# Patient Record
Sex: FEMALE | Race: WHITE | URBAN - METROPOLITAN AREA
[De-identification: names, ages, dates, MRNs, and addresses within clinical notes are randomized per-mention and may not be internally consistent; named-entity substitution may affect disease eponyms.]

---

## 2017-09-26 ENCOUNTER — OFFICE (OUTPATIENT)
Dept: URBAN - METROPOLITAN AREA CLINIC 6 | Facility: CLINIC | Age: 57
End: 2017-09-26

## 2017-09-26 VITALS
WEIGHT: 166 LBS | SYSTOLIC BLOOD PRESSURE: 135 MMHG | TEMPERATURE: 95.7 F | DIASTOLIC BLOOD PRESSURE: 81 MMHG | HEIGHT: 66 IN | HEART RATE: 80 BPM

## 2017-09-26 DIAGNOSIS — K52.9 CHRONIC DIARRHEA: ICD-10-CM

## 2017-09-26 DIAGNOSIS — K29.40: ICD-10-CM

## 2017-09-26 DIAGNOSIS — K25.9: ICD-10-CM

## 2017-09-26 PROCEDURE — 99245 OFF/OP CONSLTJ NEW/EST HI 55: CPT | Performed by: SPECIALIST

## 2017-09-26 NOTE — SERVICEHPINOTES
The patient complains of diarrhea.    Diarrheal symptoms started   close to 10  years   ago.   Bowel movements have been occurring   10 - 15   times per day.    At the onset, diarrhea started   gradually  .   Stools have been occurring   at random times of the day and in the middle of the night  .   The stool is described as   very watery   in consistency.   The patient   has   seen blood in the stool.   The patient has also noted   abominal pain, dehydration, fecal urgency, nocturnal awakenings, blood and mucus in stool, and undigested foods in stool  .   Possible exposures/etiologies include   .    Suspected exacerbating factors include   .   Diarrhea is alleviated so far by   .   The patient has already tried taking   everything per patient  .   Has noted   minimal   relief.   Has had    performed thus far for evaluation.   A prior colonoscopy was performed    years ago.   Patient had a colonoscopy in 2016 and 2012, both were essentially unremarkable.Patient also reports history of gastric ulcers and previous upper endoscopy showed atrophic gastritis. She has not followed up on that. She has been taking omeprazole daily, and skipping a day of omeprazole would cause her excessive heartburn and epigastric pain.BR

## 2017-11-17 ENCOUNTER — AMBULATORY SURGICAL CENTER (OUTPATIENT)
Dept: URBAN - METROPOLITAN AREA SURGERY 5 | Facility: SURGERY | Age: 57
End: 2017-11-17

## 2017-11-17 VITALS
HEART RATE: 79 BPM | OXYGEN SATURATION: 97 % | DIASTOLIC BLOOD PRESSURE: 78 MMHG | WEIGHT: 155 LBS | TEMPERATURE: 98.2 F | SYSTOLIC BLOOD PRESSURE: 143 MMHG | HEIGHT: 66 IN | RESPIRATION RATE: 12 BRPM

## 2017-11-17 DIAGNOSIS — K52.9 NONINFECTIVE GASTROENTERITIS AND COLITIS, UNSPECIFIED: ICD-10-CM

## 2017-11-17 DIAGNOSIS — K29.70 GASTRITIS, UNSPECIFIED, WITHOUT BLEEDING: ICD-10-CM

## 2017-11-17 DIAGNOSIS — K25.7 CHRONIC GASTRIC ULCER WITHOUT HEMORRHAGE OR PERFORATION: ICD-10-CM

## 2017-11-17 DIAGNOSIS — K21.0 GASTRO-ESOPHAGEAL REFLUX DISEASE WITH ESOPHAGITIS: ICD-10-CM

## 2017-11-17 PROBLEM — R10.13 EPIGASTRIC PAIN: Status: ACTIVE | Noted: 2017-11-17

## 2017-11-17 PROBLEM — K31.89 OTHER DISEASES OF STOMACH AND DUODENUM: Status: ACTIVE | Noted: 2017-11-17

## 2017-11-17 LAB — SURGICAL: PDF REPORT: (no result)

## 2017-11-17 PROCEDURE — 43239 EGD BIOPSY SINGLE/MULTIPLE: CPT | Performed by: SPECIALIST

## 2017-11-17 RX ORDER — OMEPRAZOLE 20 MG/1
CAPSULE, DELAYED RELEASE ORAL
Qty: 90 | Status: ACTIVE

## 2017-11-20 LAB — BREATH TEST: PDF REPORT: (no result)

## 2017-12-21 ENCOUNTER — OFFICE (OUTPATIENT)
Dept: URBAN - METROPOLITAN AREA CLINIC 6 | Facility: CLINIC | Age: 57
End: 2017-12-21

## 2017-12-21 VITALS
DIASTOLIC BLOOD PRESSURE: 81 MMHG | SYSTOLIC BLOOD PRESSURE: 154 MMHG | TEMPERATURE: 97.2 F | HEART RATE: 80 BPM | HEIGHT: 66 IN

## 2017-12-21 DIAGNOSIS — K25.7 CHRONIC GASTRIC ULCER WITHOUT HEMORRHAGE OR PERFORATION: ICD-10-CM

## 2017-12-21 DIAGNOSIS — A04.9 SMALL BOWEL BACTERIAL OVERGROWTH SYNDROME: ICD-10-CM

## 2017-12-21 DIAGNOSIS — K52.9 CHRONIC DIARRHEA: ICD-10-CM

## 2017-12-21 DIAGNOSIS — K21.0 GASTRO-ESOPHAGEAL REFLUX DISEASE WITH ESOPHAGITIS: ICD-10-CM

## 2017-12-21 PROCEDURE — 99214 OFFICE O/P EST MOD 30 MIN: CPT | Performed by: SPECIALIST

## 2017-12-21 NOTE — SERVICEHPINOTES
Patient is here for followup. Recent upper endoscopy showed reflux esophagitis and a small antral ulcer. Biopsies were negative for H. pylori. She was started on omeprazole 40 mg daily. Patient denies using nonsteroidal anti-inflammatory drugs except for aspirin 81 mg on a daily basis.Patient also underwent hydrogen breath test for bacterial overgrowth which came back positive. She was treated with metronidazole and Xifaxan for 2 weeks with significant improvement. She still has diarrhea, bloating, cramps and nausea. She is not following dietary modifications.

## 2018-01-23 ENCOUNTER — AMBULATORY SURGICAL CENTER (OUTPATIENT)
Dept: URBAN - METROPOLITAN AREA SURGERY 5 | Facility: SURGERY | Age: 58
End: 2018-01-23

## 2018-01-23 VITALS
SYSTOLIC BLOOD PRESSURE: 124 MMHG | WEIGHT: 161 LBS | OXYGEN SATURATION: 98 % | TEMPERATURE: 97.8 F | RESPIRATION RATE: 20 BRPM | DIASTOLIC BLOOD PRESSURE: 72 MMHG | HEART RATE: 56 BPM | HEIGHT: 66 IN

## 2018-01-23 DIAGNOSIS — K25.7 CHRONIC GASTRIC ULCER WITHOUT HEMORRHAGE OR PERFORATION: ICD-10-CM

## 2018-01-23 DIAGNOSIS — K31.89 OTHER DISEASES OF STOMACH AND DUODENUM: ICD-10-CM

## 2018-01-23 PROCEDURE — 43239 EGD BIOPSY SINGLE/MULTIPLE: CPT | Performed by: SPECIALIST

## 2018-01-24 LAB — SURGICAL: PDF REPORT: (no result)

## 2018-04-16 ENCOUNTER — HOSPITAL ENCOUNTER (OUTPATIENT)
Dept: HOSPITAL 4 - SDS | Age: 58
Discharge: HOME | End: 2018-04-16
Attending: SURGERY
Payer: COMMERCIAL

## 2018-04-16 VITALS — WEIGHT: 167 LBS | BODY MASS INDEX: 27.82 KG/M2 | HEIGHT: 65 IN

## 2018-04-16 VITALS — SYSTOLIC BLOOD PRESSURE: 139 MMHG

## 2018-04-16 DIAGNOSIS — Z88.8: ICD-10-CM

## 2018-04-16 DIAGNOSIS — D24.1: Primary | ICD-10-CM

## 2018-04-16 DIAGNOSIS — Z90.710: ICD-10-CM

## 2018-04-16 DIAGNOSIS — F17.210: ICD-10-CM

## 2018-04-16 DIAGNOSIS — E03.9: ICD-10-CM

## 2018-04-16 DIAGNOSIS — Z79.899: ICD-10-CM

## 2018-04-16 PROCEDURE — 88305 TISSUE EXAM BY PATHOLOGIST: CPT

## 2018-04-16 PROCEDURE — 19081 BX BREAST 1ST LESION STRTCTC: CPT

## 2018-04-16 PROCEDURE — 76098 X-RAY EXAM SURGICAL SPECIMEN: CPT

## 2018-04-16 PROCEDURE — 19120 REMOVAL OF BREAST LESION: CPT

## 2018-04-16 PROCEDURE — 19281 PERQ DEVICE BREAST 1ST IMAG: CPT

## 2018-09-04 ENCOUNTER — OFFICE (OUTPATIENT)
Dept: URBAN - METROPOLITAN AREA CLINIC 6 | Facility: CLINIC | Age: 58
End: 2018-09-04

## 2018-09-04 VITALS
TEMPERATURE: 98.3 F | SYSTOLIC BLOOD PRESSURE: 140 MMHG | HEART RATE: 78 BPM | WEIGHT: 172 LBS | DIASTOLIC BLOOD PRESSURE: 82 MMHG | HEIGHT: 66 IN

## 2018-09-04 DIAGNOSIS — K52.9 CHRONIC DIARRHEA: ICD-10-CM

## 2018-09-04 DIAGNOSIS — R14.0 ABDOMINAL BLOATING: ICD-10-CM

## 2018-09-04 DIAGNOSIS — A04.9 SMALL BOWEL BACTERIAL OVERGROWTH SYNDROME: ICD-10-CM

## 2018-09-04 DIAGNOSIS — K25.9 HISTORY OF GASTRIC ULCER: ICD-10-CM

## 2018-09-04 PROCEDURE — 99214 OFFICE O/P EST MOD 30 MIN: CPT | Performed by: SPECIALIST

## 2018-09-04 RX ORDER — OMEPRAZOLE 20 MG/1
CAPSULE, DELAYED RELEASE ORAL
Qty: 90 | Status: ACTIVE

## 2018-09-04 RX ORDER — RIFAXIMIN 550 MG/1
1650 TABLET ORAL
Qty: 42 | Status: COMPLETED
Start: 2018-09-04 | End: 2018-09-12

## 2018-09-04 RX ORDER — NEOMYCIN SULFATE 500 MG/1
1000 TABLET ORAL
Qty: 28 | Status: COMPLETED
Start: 2018-09-04 | End: 2018-09-12

## 2018-09-04 NOTE — SERVICEHPINOTES
57-year-old female presents with chronic diarrhea. Patient reports diarrhea 10-15 times a day, associated with abdominal bloating, cramps, and flatulence. Diarrhea sometimes occurs during sleep. Patient reports urgency. She denies any gastrointestinal bleeding. She had tried course of Xifaxan and metronidazole for SIBO which helped her symptoms briefly. She had also tried Lialda and budesonide in the past without improvement. Patient also states that she had recent laboratory tests showing elevated WBCs and inflammatory markers (?).Patient also has history of gastric ulcer. She takes Aspirin and omeprazole daily.

## 2018-10-30 ENCOUNTER — OFFICE (OUTPATIENT)
Dept: URBAN - METROPOLITAN AREA CLINIC 6 | Facility: CLINIC | Age: 58
End: 2018-10-30

## 2018-10-30 VITALS
WEIGHT: 176 LBS | SYSTOLIC BLOOD PRESSURE: 153 MMHG | DIASTOLIC BLOOD PRESSURE: 87 MMHG | HEIGHT: 66 IN | TEMPERATURE: 98.1 F | HEART RATE: 88 BPM

## 2018-10-30 DIAGNOSIS — K52.831 COLLAGENOUS COLITIS: ICD-10-CM

## 2018-10-30 DIAGNOSIS — K52.9 CHRONIC DIARRHEA: ICD-10-CM

## 2018-10-30 PROCEDURE — 99215 OFFICE O/P EST HI 40 MIN: CPT | Performed by: SPECIALIST

## 2018-10-30 RX ORDER — BUDESONIDE 3 MG/1
9 CAPSULE ORAL
Qty: 90 | Status: COMPLETED
Start: 2018-10-30 | End: 2018-12-11

## 2018-10-30 NOTE — SERVICEHPINOTES
57-year-old female presents to follow up on chronic diarrhea. On last visit patient was prescribed treatment for small bowel bacterial overgrowth with Augmentin, which she was not able to tolerate, developed an yeast infection and discontinued the medication after taking it for 2 days. Currently, patient continues to have diarrhea, 10-15 times a day, associated with significant urgency, abdominal bloating, cramps and flatulence. Diarrhea sometimes occurs during sleep. She denies any weight loss or gastrointestinal bleeding. Biopsy from previous colonoscopy in 2012 showed collagenous colitis.

## 2018-12-11 ENCOUNTER — OFFICE (OUTPATIENT)
Dept: URBAN - METROPOLITAN AREA CLINIC 6 | Facility: CLINIC | Age: 58
End: 2018-12-11

## 2018-12-11 VITALS — HEIGHT: 66 IN | WEIGHT: 176 LBS | TEMPERATURE: 98 F

## 2018-12-11 DIAGNOSIS — K52.9 CHRONIC DIARRHEA: ICD-10-CM

## 2018-12-11 DIAGNOSIS — K52.831 COLLAGENOUS COLITIS: ICD-10-CM

## 2018-12-11 DIAGNOSIS — K62.5 RECTAL BLEEDING: ICD-10-CM

## 2018-12-11 PROCEDURE — 99214 OFFICE O/P EST MOD 30 MIN: CPT | Performed by: SPECIALIST

## 2018-12-11 NOTE — SERVICEHPINOTES
58-year-old female presents to follow up on chronic diarrhea. On last visit patient was prescribed budesonide for diarrhea associated with collagenous colitis. Patient took it for 2 weeks then discontinued it because she was experiencing leg cramps and heart palpitations. However, the budesonide did improve her diarrhea, from 10-15 times a day to currently, 1-2 times a day. She denies any significant urgency or abdominal pain. She denies any fever or chills. She does report rectal bleeding (a concerning amount) in the toilet bowl for the past 3 days.

## 2018-12-21 ENCOUNTER — AMBULATORY SURGICAL CENTER (OUTPATIENT)
Dept: URBAN - METROPOLITAN AREA SURGERY 5 | Facility: SURGERY | Age: 58
End: 2018-12-21

## 2018-12-21 VITALS
TEMPERATURE: 98.4 F | DIASTOLIC BLOOD PRESSURE: 70 MMHG | RESPIRATION RATE: 20 BRPM | HEIGHT: 66 IN | SYSTOLIC BLOOD PRESSURE: 154 MMHG | HEART RATE: 77 BPM | WEIGHT: 176 LBS | OXYGEN SATURATION: 95 %

## 2018-12-21 DIAGNOSIS — K52.9 NONINFECTIVE GASTROENTERITIS AND COLITIS, UNSPECIFIED: ICD-10-CM

## 2018-12-21 DIAGNOSIS — K52.831 COLLAGENOUS COLITIS: ICD-10-CM

## 2018-12-21 DIAGNOSIS — K64.0 FIRST DEGREE HEMORRHOIDS: ICD-10-CM

## 2018-12-21 PROCEDURE — 45378 DIAGNOSTIC COLONOSCOPY: CPT | Performed by: SPECIALIST

## 2018-12-24 LAB — SURGICAL: PDF REPORT: (no result)
